# Patient Record
Sex: FEMALE | Race: WHITE
[De-identification: names, ages, dates, MRNs, and addresses within clinical notes are randomized per-mention and may not be internally consistent; named-entity substitution may affect disease eponyms.]

---

## 2019-07-05 ENCOUNTER — HOSPITAL ENCOUNTER (EMERGENCY)
Dept: HOSPITAL 10 - FTE | Age: 54
Discharge: HOME | End: 2019-07-05
Payer: COMMERCIAL

## 2019-07-05 ENCOUNTER — HOSPITAL ENCOUNTER (EMERGENCY)
Dept: HOSPITAL 91 - FTE | Age: 54
Discharge: HOME | End: 2019-07-05
Payer: COMMERCIAL

## 2019-07-05 VITALS
HEIGHT: 62 IN | BODY MASS INDEX: 31.32 KG/M2 | WEIGHT: 170.2 LBS | HEIGHT: 62 IN | BODY MASS INDEX: 31.32 KG/M2 | WEIGHT: 170.2 LBS

## 2019-07-05 VITALS — RESPIRATION RATE: 16 BRPM | SYSTOLIC BLOOD PRESSURE: 120 MMHG | HEART RATE: 70 BPM | DIASTOLIC BLOOD PRESSURE: 72 MMHG

## 2019-07-05 DIAGNOSIS — E11.9: ICD-10-CM

## 2019-07-05 DIAGNOSIS — Z79.82: ICD-10-CM

## 2019-07-05 DIAGNOSIS — N39.0: Primary | ICD-10-CM

## 2019-07-05 DIAGNOSIS — B34.9: ICD-10-CM

## 2019-07-05 LAB
ADD UMIC: YES
UR ASCORBIC ACID: NEGATIVE MG/DL
UR BILIRUBIN (DIP): NEGATIVE MG/DL
UR BLOOD (DIP): NEGATIVE MG/DL
UR CLARITY: CLEAR
UR COLOR: (no result)
UR GLUCOSE (DIP): NEGATIVE MG/DL
UR KETONES (DIP): NEGATIVE MG/DL
UR LEUKOCYTE ESTERASE (DIP): (no result) LEU/UL
UR NITRITE (DIP): NEGATIVE MG/DL
UR PH (DIP): 5 (ref 5–9)
UR RBC: 1 /HPF (ref 0–5)
UR SPECIFIC GRAVITY (DIP): 1 (ref 1–1.03)
UR SQUAMOUS EPITHELIAL CELL: (no result) /HPF
UR TOTAL PROTEIN (DIP): NEGATIVE MG/DL
UR UROBILINOGEN (DIP): NEGATIVE MG/DL
UR WBC: 6 /HPF (ref 0–5)

## 2019-07-05 PROCEDURE — 81001 URINALYSIS AUTO W/SCOPE: CPT

## 2019-07-05 PROCEDURE — 87210 SMEAR WET MOUNT SALINE/INK: CPT

## 2019-07-05 PROCEDURE — 96372 THER/PROPH/DIAG INJ SC/IM: CPT

## 2019-07-05 PROCEDURE — 99284 EMERGENCY DEPT VISIT MOD MDM: CPT

## 2019-07-05 RX ADMIN — PHENAZOPYRIDINE HYDROCHLORIDE 1 MG: 100 TABLET ORAL at 23:50

## 2019-07-05 RX ADMIN — KETOROLAC TROMETHAMINE 1 MG: 30 INJECTION, SOLUTION INTRAMUSCULAR at 22:27

## 2019-07-05 RX ADMIN — CEPHALEXIN 1 MG: 500 CAPSULE ORAL at 23:50

## 2019-07-20 ENCOUNTER — HOSPITAL ENCOUNTER (EMERGENCY)
Dept: HOSPITAL 91 - FTE | Age: 54
LOS: 1 days | Discharge: HOME | End: 2019-07-21
Payer: COMMERCIAL

## 2019-07-20 ENCOUNTER — HOSPITAL ENCOUNTER (EMERGENCY)
Dept: HOSPITAL 10 - FTE | Age: 54
LOS: 1 days | Discharge: HOME | End: 2019-07-21
Payer: COMMERCIAL

## 2019-07-20 VITALS
BODY MASS INDEX: 31.2 KG/M2 | WEIGHT: 169.54 LBS | HEIGHT: 62 IN | WEIGHT: 169.54 LBS | BODY MASS INDEX: 31.2 KG/M2 | HEIGHT: 62 IN

## 2019-07-20 DIAGNOSIS — E11.9: ICD-10-CM

## 2019-07-20 DIAGNOSIS — Z79.82: ICD-10-CM

## 2019-07-20 DIAGNOSIS — Z79.84: ICD-10-CM

## 2019-07-20 DIAGNOSIS — N39.0: Primary | ICD-10-CM

## 2019-07-20 DIAGNOSIS — I10: ICD-10-CM

## 2019-07-20 LAB
ADD MAN DIFF?: NO
ADD UMIC: YES
ALANINE AMINOTRANSFERASE: 19 IU/L (ref 13–69)
ALBUMIN/GLOBULIN RATIO: 1.22
ALBUMIN: 4.3 G/DL (ref 3.3–4.9)
ALKALINE PHOSPHATASE: 121 IU/L (ref 42–121)
ANION GAP: 9 (ref 5–13)
ASPARTATE AMINO TRANSFERASE: 23 IU/L (ref 15–46)
BASOPHIL #: 0 10^3/UL (ref 0–0.1)
BASOPHILS %: 0.7 % (ref 0–2)
BILIRUBIN,DIRECT: 0 MG/DL (ref 0–0.2)
BILIRUBIN,TOTAL: 0.2 MG/DL (ref 0.2–1.3)
BLOOD UREA NITROGEN: 12 MG/DL (ref 7–20)
CALCIUM: 9.6 MG/DL (ref 8.4–10.2)
CARBON DIOXIDE: 25 MMOL/L (ref 21–31)
CHLORIDE: 105 MMOL/L (ref 97–110)
CREATININE: 0.74 MG/DL (ref 0.44–1)
EOSINOPHILS #: 0.1 10^3/UL (ref 0–0.5)
EOSINOPHILS %: 2 % (ref 0–7)
GLOBULIN: 3.5 G/DL (ref 1.3–3.2)
GLUCOSE: 110 MG/DL (ref 70–220)
HEMATOCRIT: 34.2 % (ref 37–47)
HEMOGLOBIN: 11.5 G/DL (ref 12–16)
IMMATURE GRANS #M: 0.01 10^3/UL (ref 0–0.03)
IMMATURE GRANS % (M): 0.2 % (ref 0–0.43)
LIPASE: 317 U/L (ref 23–300)
LYMPHOCYTES #: 2 10^3/UL (ref 0.8–2.9)
LYMPHOCYTES %: 34.1 % (ref 15–51)
MEAN CORPUSCULAR HEMOGLOBIN: 29.2 PG (ref 29–33)
MEAN CORPUSCULAR HGB CONC: 33.6 G/DL (ref 32–37)
MEAN CORPUSCULAR VOLUME: 86.8 FL (ref 82–101)
MEAN PLATELET VOLUME: 9.9 FL (ref 7.4–10.4)
MONOCYTE #: 0.4 10^3/UL (ref 0.3–0.9)
MONOCYTES %: 6.1 % (ref 0–11)
NEUTROPHIL #: 3.4 10^3/UL (ref 1.6–7.5)
NEUTROPHILS %: 56.9 % (ref 39–77)
NUCLEATED RED BLOOD CELLS #: 0 10^3/UL (ref 0–0)
NUCLEATED RED BLOOD CELLS%: 0 /100WBC (ref 0–0)
PLATELET COUNT: 198 10^3/UL (ref 140–415)
POTASSIUM: 4.2 MMOL/L (ref 3.5–5.1)
RED BLOOD COUNT: 3.94 10^6/UL (ref 4.2–5.4)
RED CELL DISTRIBUTION WIDTH: 12.4 % (ref 11.5–14.5)
SODIUM: 139 MMOL/L (ref 135–144)
TOTAL PROTEIN: 7.8 G/DL (ref 6.1–8.1)
UR ASCORBIC ACID: NEGATIVE MG/DL
UR BACTERIA: (no result) /HPF
UR BILIRUBIN (DIP): NEGATIVE MG/DL
UR BLOOD (DIP): NEGATIVE MG/DL
UR CLARITY: CLEAR
UR COLOR: (no result)
UR GLUCOSE (DIP): NEGATIVE MG/DL
UR KETONES (DIP): NEGATIVE MG/DL
UR LEUKOCYTE ESTERASE (DIP): (no result) LEU/UL
UR NITRITE (DIP): NEGATIVE MG/DL
UR PH (DIP): 6 (ref 5–9)
UR RBC: 1 /HPF (ref 0–5)
UR SPECIFIC GRAVITY (DIP): 1.01 (ref 1–1.03)
UR TOTAL PROTEIN (DIP): NEGATIVE MG/DL
UR UROBILINOGEN (DIP): NEGATIVE MG/DL
UR WBC: 6 /HPF (ref 0–5)
URINE LEUKOCYTE EST (DIP) POC: (no result)
URINE PH (DIP) POC: 6 (ref 5–8.5)
WHITE BLOOD COUNT: 5.9 10^3/UL (ref 4.8–10.8)

## 2019-07-20 PROCEDURE — 81001 URINALYSIS AUTO W/SCOPE: CPT

## 2019-07-20 PROCEDURE — 80053 COMPREHEN METABOLIC PANEL: CPT

## 2019-07-20 PROCEDURE — 36415 COLL VENOUS BLD VENIPUNCTURE: CPT

## 2019-07-20 PROCEDURE — 85025 COMPLETE CBC W/AUTO DIFF WBC: CPT

## 2019-07-20 PROCEDURE — 96372 THER/PROPH/DIAG INJ SC/IM: CPT

## 2019-07-20 PROCEDURE — 81003 URINALYSIS AUTO W/O SCOPE: CPT

## 2019-07-20 PROCEDURE — 76705 ECHO EXAM OF ABDOMEN: CPT

## 2019-07-20 PROCEDURE — 83690 ASSAY OF LIPASE: CPT

## 2019-07-20 PROCEDURE — 99285 EMERGENCY DEPT VISIT HI MDM: CPT

## 2019-07-20 RX ADMIN — ALUMINUM HYDROXIDE, MAGNESIUM HYDROXIDE, DIMETHICONE 1 ML: 200; 200; 20 SUSPENSION ORAL at 21:59

## 2019-07-20 RX ADMIN — KETOROLAC TROMETHAMINE 1 MG: 30 INJECTION, SOLUTION INTRAMUSCULAR at 21:59

## 2019-07-20 RX ADMIN — ONDANSETRON 1 MG: 4 TABLET, ORALLY DISINTEGRATING ORAL at 21:59

## 2019-07-20 RX ADMIN — PHENOBARBITAL, HYOSCYAMINE SULFATE, ATROPINE SULFATE, SCOPOLAMINE HYDROBROMIDE 1 TAB: 16.2; .1037; .0194; .0065 TABLET ORAL at 21:59

## 2019-07-20 NOTE — ERD
ER Documentation


Chief Complaint


Chief Complaint





HA AND BODY ACHES X 2 WEEKS.





HPI


History of Present Illness: 54-year-old female with history of diabetes, 


hypertension, hyperlipidemia coming in today with complaint of headache and body


aches is been present for 2 weeks.  Patient denies flulike symptoms.  Denies 


genitourinary infectious symptoms.  Denies abdominal infectious symptoms.  


Denies HEENT infectious symptoms





At home pharmacological/nonpharmacological treatment for symptoms: Denies





Denies social concerns; Denies recent foreign travel





ROS


All systems reviewed and are negative except as per history of present illness.





Medications


Home Meds


Active Scripts


Phenazopyridine Hcl* (Pyridium*) 200 Mg Tab, 200 MG PO TID PRN for URINARY PAIN,


#5 TAB


   Prov:KEYONNA GIBSON NP         19


Ibuprofen* (Motrin*) 600 Mg Tab, 600 MG PO Q6H PRN for PAIN AND OR ELEVATED 


TEMP, #30 TAB


   Prov:KEYONNA GIBSON V ROLLY         19


Cephalexin* (Keflex*) 500 Mg Capsule, 500 MG PO BID for URINE INFECTION for 7 


Days, CAP


   Prov:KEYONNA GIBSON NP         19


Acetamin/Butalbital/Caffeine* (Fioricet*) [id number] Tab, 1 TAB PO Q6H PRN 


for PAIN, #30 TAB


   Prov:DENZEL BARTLETT MD         18


Reported Medications


Ferrous Sulfate* (Ferrous Sulfate*) 325 Mg Tabec, 325 MG PO DAILY, TAB


   18


Aspirin* (Aspirin* EC) 81 Mg Tablet.dr, 81 MG PO DAILY, TAB


   18


Atorvastatin Calcium* (Atorvastatin Calcium*) 20 Mg Tablet, 20 MG PO QHS, #30 


TAB


   18


Sitagliptin* (Januvia*) 100 Mg Tablet, 100 MG PO DAILY, #30 TAB


   18


Enalapril Maleate* (Enalapril Maleate*) 10 Mg Tablet, 10 MG PO DAILY, TAB


   18


Pantoprazole* (Pantoprazole*) 40 Mg Tablet.dr, 40 MG PO AC BREAKFAST, TAB


   18


Glyburide* (Glyburide*) 5 Mg Tablet, 10 MG PO BID, #30 TAB


   18





Allergies


Allergies:  


Coded Allergies:  


     No Known Drug Allergies (Verified  Allergy, Mild, 18)





PMhx/Soc


History of Surgery:  Yes ( X 2, GALL BLADDER, HYSTERECTOMY )


Anesthesia Reaction:  No


Hx Neurological Disorder:  No


Hx Respiratory Disorders:  No


Hx Cardiac Disorders:  Yes (htn, CHOLESTEROL )


Hx Psychiatric Problems:  No


Hx Miscellaneous Medical Probl:  Yes (dm)


Hx Alcohol Use:  No


Hx Substance Use:  No


Hx Tobacco Use:  No


Smoking Status:  Never smoker





FmHx


Family History:  No coronary disease





Physical Exam


Physical Exam


Const:   No acute distress, afebrile


Head:   Atraumatic 


Eyes:    Normal Conjunctiva


ENT:    Normal External Ears, Nose and Mouth.  No erythema, bulging, perforation


of the tympanic membranes.  No mastoiditis.


Neck:               Full range of motion. No meningismus.


Resp:   Clear to auscultation bilaterally


Cardio:   Regular rate and rhythm, no murmurs


Abd:    Soft, non tender, non distended.  No guarding, no masses, no rigidity


Skin:   No petechiae or rashes


Back:   No midline or flank tenderness


Ext:    No cyanosis, or edema


Neur:   Awake and alert x3, speaking in clear sentences, no focal deficits or 


facial asymmetry


Psych:    Normal Mood and Affect


Results 24 hrs





Laboratory Tests


                Test
                               19
22:21


                Urine Color                      STRAW


                Urine Clarity                    CLEAR


                Urine pH                                    5.0


                Urine Specific Gravity                    1.005


                Urine Ketones                    NEGATIVE mg/dL


                Urine Nitrite                    NEGATIVE mg/dL


                Urine Bilirubin                  NEGATIVE mg/dL


                Urine Urobilinogen               NEGATIVE mg/dL


                Urine Leukocyte Esterase              2+ [place]/ul


                Urine Microscopic RBC                    1 /HPF


                Urine Microscopic WBC                    6 /HPF


                Urine Squamous Epithelial
Cells  FEW /HPF 



                Urine Hemoglobin                 NEGATIVE mg/dL


                Urine Glucose                    NEGATIVE mg/dL


                Urine Total Protein              NEGATIVE mg/dl





Current Medications


 Medications
   Dose
          Sig/Shyann
       Start Time
   Status  Last


 (Trade)       Ordered        Route
 PRN     Stop Time              Admin
Dose


                              Reason                                Admin


 Ketorolac
     60 mg          ONCE  STAT
    19        DC            19


Tromethamine
                 IM
            22:21
 19                22:27



 (Toradol)                                   22:24


 Cephalexin
    500 mg         ONCE  ONCE
    19        DC            19


(Keflex)                      PO
            00:00
 19                23:50



                                             00:01


                200 mg         ONCE  ONCE
    19        DC            19


Phenazopyridi                 PO
            00:00
 19                23:50



ne
 HCl
                                     00:01


(Pyridium)








Procedures/MDM


ED COURSE:


ED course includes a thorough examination and history. 


The patient was stable throughout ED course. I kept the patient and/or family 


informed of laboratory and diagnostic imaging results throughout the ED course. 








LABS:


Urinalysis positive for 2+ leukocyte esterase, 6 WBCs


Wet mount negative for clue cells, trichomonas, yeast, infectious process





MEDICATIONS GIVEN IN ER: 


Ketorolac


Patient tolerated medication well with no adverse reactions. Patient reported 


improvement in pain. 





PROCEDURES:


None.








MEDICAL DECISION MAKING:


Low suspicion for life-threatening medical emergency.  Low suspicion for 


neurological emergency.





Otherwise healthy patient presenting with constellation of symptoms likely 


representing viral syndrome/urinary tract infection as characterized by history,


physical exam findings, lab findings.





Patient reassessment @ 2333: Results discussed.  Patient hemodynamically stable.


 No respiratory distress, otherwise relatively well appearing and nontoxic.  


Disposition given.  Patient educated on diagnoses, prescriptions, follow-up 


care, return precautions.  Strict return precautions given for worsening 


condition; questions answered discharge.  Patient verbalizes understanding of 


discharge instructions.








PRESCRIPTIONS FOR HOME:


Ibuprofen, Pyridium, Keflex








DISPOSITION: DISCHARGE


At this time, patient is stable for discharge and outpatient management. I have 


instructed the patient to follow-up with his/her primary care physician in 1-2 


days. I have discussed with the patient the possibility of needing to see a 


specialist for further workup and imaging studies if symptoms persist. I have 


instructed the patient to promptly return to the ER for any new or worsening 


symptoms including increased pain, fever, nausea, vomiting, weakness or LOC. The


patient and/or family expressed understanding of and agreement with this plan. 


All questions were answered. Home care instructions were provided. 








DISCLAIMER: 


Inadvertent spelling and grammatical errors are likely due to EHR/dictation 


software use and do not reflect on the overall quality of patient care. Also, 


please note that the electronic time recorded on this note does not necessarily 


reflect the actual time of the patient encounter.





Departure


Diagnosis:  


   Primary Impression:  


   UTI (urinary tract infection)


   Additional Impression:  


   Viral syndrome


Condition:  Stable


Patient Instructions:  Urinary Tract Infections in Women


Referrals:  


COMM[facility]ITY CL[facility]ICS


YOU HAVE RECEIVED A MEDICAL SCREENING EXAM AND THE RESULTS INDICATE THAT YOU DO 


NOT HAVE A CONDITION THAT REQUIRES URGENT TREATMENT IN THE EMERGENCY DEPARTMENT.





FURTHER EVALUATION AND TREATMENT OF YOUR CONDITION CAN WAIT UNTIL YOU ARE SEEN 


IN YOUR DOCTORS OFFICE WITHIN THE NEXT 1-2 DAYS. IT IS YOUR RESPONSIBILITY TO 


MAKE AN APPOINTMENT FOR FOLOW-UP CARE.





IF YOU HAVE A PRIMARY DOCTOR


--you should call your primary doctor and schedule an appointment





IF YOU DO NOT HAVE A PRIMARY DOCTOR YOU CAN CALL OUR PHYSICIAN REFERRAL HOTLINE 


AT


 [phone] 





IF YOU CAN NOT AFFORD TO SEE A PHYSICIAN YOU CAN CHOSE FROM THE FOLLOWING 


[facility] [phone] [address] [facility]





[facility] [phone] [address] [facility]





[facility] [phone] [address] [facility] [phone] [address] [facility] [phone] [address] [facility]. [facility] [phone] [address] [facility]. [facility]


YOU HAVE RECEIVED A MEDICAL SCREENING EXAM AND THE RESULTS INDICATE THAT YOU DO 


NOT HAVE A CONDITION THAT REQUIRES URGENT TREATMENT IN THE EMERGENCY DEPARTMENT.





FURTHER EVALUATION AND TREATMENT OF YOUR CONDITION CAN WAIT UNTIL YOU ARE SEEN 


IN YOUR DOCTORS OFFICE WITHIN THE NEXT 1-2 DAYS. IT IS YOUR RESPONSIBILITY TO 


MAKE AN APPOINTMENT FOR FOLOW-UP CARE.





IF YOU HAVE A PRIMARY DOCTOR


--you should call your primary doctor and schedule and appointment





IF YOU DO NOT HAVE A PRIMARY DOCTOR YOU CAN CALL OUR PHYSICIAN REFERRAL HOTLINE 


AT [phone].





IF YOU CAN NOT AFFORD TO SEE A PHYSICIAN YOU CAN CHOSE FROM THE FOLLOWING [facility]


INSTITUTIONS:





[facility]


[address] [place] [address]





[facility]


[address] [place] [address]





[facility]


[address] [place] [address]





Additional Instructions:  


Google Translate utilizado para la traduccin de las siguientes lneas, por 


favor, disculpe los errores.





Muchas roberto por permitirnos participar en glez cuidado.


Glez jacquelin y seguridad es nuestra principal prioridad en [facility]. Es importante leer todas las instrucciones de gee y la educacin que 


se proporcionan en glez paquete de gee.





Llame a glez mdico de atencin primaria MAANA para deandra kavita ben los prximos


2 a 4 matias y lleve toda la informacin y los medicamentos recetados.





Llene las recetas y siga exactamente las instrucciones de la etiqueta.





-Cefalexina es un antibitico; tome marce medicamento todos los matias clau se 


indica en glez receta. Debe completar todo el curso de tratamiento que figura en 


glez receta. [place] es muy importante porque se necesitan varios matias para eliminar 


las bacterias que causan la infeccin.


-El ibuprofeno es un medicamento que ayuda con el dolor / inflamacin. En la 


dosis de 600 a 800 mg, esto ayudar con la inflamacin / hinchazn. [place] marce me


dicamento segn las indicaciones.


-Fenazopyridine / Pyridium es un medicamento que ayudar a disminuir el dolor 


urinario. Marce medicamento christopher que glez orina adquiera un color naranja. Marce es 


un efecto secundario normal de la medicacin.





Si los sntomas empeoran y glez proveedor no est disponible, regrese 


inmediatamente al Departamento de Emergencias.


-----


Google Translate used for translation of following lines, please excuse errors. 





Thank you very much for allowing us to participate in your care. 


Your health and safety is our top priority at [facility].  It 


is important to read all discharge instructions and education provided in your 


discharge packet.





Call your primary care doctor TOMORROW for an appointment during the next 2-4 


days and bring all the information and medications prescribed. 





Have prescriptions filled and follow precisely the directions on the label. 


-Cephalexin is an antibiotic; take this medication every day as listed on your 


prescription.  You must complete the entire course of treatment that is listed 


on your prescription this is very important because it takes a certain number of


days to kill the bacteria that is causing the infection.


-Ibuprofen is a medication that will help with pain/inflammation.  At the dosage


of 600 to 800 mg, this will help with inflammation/swelling.  Take this 


medication as prescribed.


-Phenazopyridine/Pyridium is a medication that will help decrease urinary pain. 


This medication will make your urine turn orange color.  This is a normal side 


effect of the medication.





If the symptoms get worse and your provider is unavailable, return to the 


Emergency Department immediately.











KEYONNA GIBSON NP            2019 04:38

## 2019-07-21 VITALS — DIASTOLIC BLOOD PRESSURE: 67 MMHG | RESPIRATION RATE: 17 BRPM | HEART RATE: 61 BPM | SYSTOLIC BLOOD PRESSURE: 147 MMHG
